# Patient Record
Sex: FEMALE | Race: WHITE | URBAN - METROPOLITAN AREA
[De-identification: names, ages, dates, MRNs, and addresses within clinical notes are randomized per-mention and may not be internally consistent; named-entity substitution may affect disease eponyms.]

---

## 2018-08-10 ENCOUNTER — OFFICE VISIT (OUTPATIENT)
Dept: URBAN - METROPOLITAN AREA CLINIC 18 | Facility: CLINIC | Age: 73
End: 2018-08-10
Payer: MEDICARE

## 2018-08-10 PROCEDURE — 99213 OFFICE O/P EST LOW 20 MIN: CPT | Performed by: OPTOMETRIST

## 2018-08-10 RX ORDER — TIMOLOL MALEATE 5 MG/ML
0.5 % SOLUTION OPHTHALMIC
Qty: 3 | Refills: 3 | Status: INACTIVE
Start: 2018-08-10 | End: 2019-04-16

## 2018-08-10 RX ORDER — BIMATOPROST 0.1 MG/ML
0.01 % SOLUTION/ DROPS OPHTHALMIC
Qty: 3 | Refills: 3 | Status: INACTIVE
Start: 2018-08-10 | End: 2018-08-10

## 2018-08-10 RX ORDER — BIMATOPROST 0.1 MG/ML
0.01 % SOLUTION/ DROPS OPHTHALMIC
Qty: 3 | Refills: 3 | Status: INACTIVE
Start: 2018-08-10 | End: 2019-09-12

## 2018-08-10 ASSESSMENT — INTRAOCULAR PRESSURE
OS: 14
OD: 13

## 2018-08-10 NOTE — IMPRESSION/PLAN
Impression: Diagnosis: Primary open-angle glaucoma, bilateral, moderate stage. Code: U16.8866. IOP stable OU
ON Drusen Plan: Continue Lumigan qhs OU, timolol . 5 q12h OU
orig IOP 19/19, OCT-44??/51(31?/43?0(38?/56)(48/53)(42/55)(45/57), VF-OU sup inf/glenroy unreliable BJW minimal changes OU BJW some mild changes

## 2018-08-10 NOTE — IMPRESSION/PLAN
Impression: Diagnosis: Type 2 diabetes mellitus without complications. Code: E11.9. Plan: Diabetes type II: no background retinopathy, no signs of neovascularization noted. Discussed ocular and systemic benefits of blood sugar control.

## 2018-12-07 ENCOUNTER — OFFICE VISIT (OUTPATIENT)
Dept: URBAN - METROPOLITAN AREA CLINIC 18 | Facility: CLINIC | Age: 73
End: 2018-12-07
Payer: MEDICARE

## 2018-12-07 DIAGNOSIS — Z96.1 PRESENCE OF INTRAOCULAR LENS: ICD-10-CM

## 2018-12-07 PROCEDURE — 99213 OFFICE O/P EST LOW 20 MIN: CPT | Performed by: OPTOMETRIST

## 2018-12-07 ASSESSMENT — INTRAOCULAR PRESSURE
OD: 16
OS: 16

## 2018-12-07 NOTE — IMPRESSION/PLAN
Impression: Diagnosis: Primary open-angle glaucoma, bilateral, moderate stage. Code: D40.0644. IOP borderline OU
ON Drusen Plan: Continue Lumigan qhs OU, timolol . 5 q12h OU
orig IOP 19/19, OCT-44??/51(31?/43?0(38?/56)(48/53)(42/55)(45/57), VF-OU sup inf/glenroy unreliable BJW minimal changes OU BJW some mild changes

## 2019-02-05 ENCOUNTER — OFFICE VISIT (OUTPATIENT)
Dept: URBAN - METROPOLITAN AREA CLINIC 17 | Facility: CLINIC | Age: 74
End: 2019-02-05
Payer: MEDICARE

## 2019-02-05 PROCEDURE — 99213 OFFICE O/P EST LOW 20 MIN: CPT | Performed by: OPTOMETRIST

## 2019-02-05 ASSESSMENT — INTRAOCULAR PRESSURE
OD: 17
OS: 17

## 2019-02-05 NOTE — IMPRESSION/PLAN
Impression: Diagnosis: Primary open-angle glaucoma, bilateral, moderate stage. Code: C43.3828. 
IOP stable OU- Patient is currently on Prednisone 20mg due to recent fall and will be off this week Plan: Continue Lumigan qhs OU, Timolol .5 Q12h OU
orig IOP 19/19, OCT-44??/51(31?/43?0(38?/56)(48/53)(42/55)(45/57), VF-OU sup inf/glenroy unreliable BJW minimal changes OU BJW some mild changes

## 2019-04-08 ENCOUNTER — OFFICE VISIT (OUTPATIENT)
Dept: URBAN - METROPOLITAN AREA CLINIC 18 | Facility: CLINIC | Age: 74
End: 2019-04-08
Payer: MEDICARE

## 2019-04-08 PROCEDURE — 99214 OFFICE O/P EST MOD 30 MIN: CPT | Performed by: OPTOMETRIST

## 2019-04-08 PROCEDURE — 92083 EXTENDED VISUAL FIELD XM: CPT | Performed by: OPTOMETRIST

## 2019-04-08 PROCEDURE — 92133 CPTRZD OPH DX IMG PST SGM ON: CPT | Performed by: OPTOMETRIST

## 2019-04-08 ASSESSMENT — INTRAOCULAR PRESSURE
OD: 14
OS: 11
OS: 14
OD: 10

## 2019-04-08 NOTE — IMPRESSION/PLAN
Impression: Diagnosis: Primary open-angle glaucoma, bilateral, moderate stage.  Code: Q75.4934.
stable IOP OU Plan: Continue Lumigan qhs OU, Timolol .5 Q12h OU
orig IOP 19/19, OCT-52?/46(44??/51)(31?/43?0(38?/56)(48/53)(42/55)(45/57), VF-OU sup inf glenroy BJW minimal changes OU

## 2019-04-08 NOTE — IMPRESSION/PLAN
Impression: Diagnosis: Type 2 diabetes mellitus without complications. Code: E11.9. Plan: Diabetes type II: No signs of neovascularization noted. No macular edema. No background retinopathy. Recommended careful diabetic blood sugar control and discussed benefits.

## 2019-07-23 ENCOUNTER — OFFICE VISIT (OUTPATIENT)
Dept: URBAN - METROPOLITAN AREA CLINIC 18 | Facility: CLINIC | Age: 74
End: 2019-07-23
Payer: MEDICARE

## 2019-07-23 DIAGNOSIS — E11.9 TYPE 2 DIABETES MELLITUS WITHOUT COMPLICATIONS: ICD-10-CM

## 2019-07-23 PROCEDURE — 92012 INTRM OPH EXAM EST PATIENT: CPT | Performed by: OPTOMETRIST

## 2019-07-23 ASSESSMENT — KERATOMETRY
OS: 46.38
OD: 46.13

## 2019-07-23 ASSESSMENT — INTRAOCULAR PRESSURE
OD: 14
OS: 15

## 2019-07-23 NOTE — IMPRESSION/PLAN
Impression: Diagnosis: Primary open-angle glaucoma, bilateral, moderate stage. Code: M96.0181. 
IOP stable OU Plan: Continue Lumigan qhs OU, Timolol .5 Q12h OU
orig IOP 19/19, OCT-52?/46(44??/51)(31?/43?0(38?/56)(48/53)(42/55)(45/57), VF-OU sup inf glenroy BJW minimal changes OU

## 2019-09-27 ENCOUNTER — OFFICE VISIT (OUTPATIENT)
Dept: URBAN - METROPOLITAN AREA CLINIC 18 | Facility: CLINIC | Age: 74
End: 2019-09-27
Payer: MEDICARE

## 2019-09-27 PROCEDURE — 92004 COMPRE OPH EXAM NEW PT 1/>: CPT | Performed by: OPHTHALMOLOGY

## 2019-09-27 ASSESSMENT — INTRAOCULAR PRESSURE
OS: 11
OD: 11

## 2019-09-27 ASSESSMENT — VISUAL ACUITY
OS: 20/80
OD: 20/70

## 2019-09-27 NOTE — IMPRESSION/PLAN
Impression: Presence of intraocular lens: Z96.1. OS. Plan: Discussed diagnosis in detail with patient. No treatment is required at this time. Will continue to observe condition and or symptoms.

## 2019-09-27 NOTE — IMPRESSION/PLAN
Impression: Age-related nuclear cataract, right eye: H25.11. Condition: established, worsening. Symptoms: could improve with surgery. Vision: vision affected. Plan: Cataract accounts for patient's complaints. Reviewed risks, benefits, and procedure. Patient desires surgery, schedule ce/iol OD, RL2, standard lens, distance refractive target, patient is clear for surgery in [place] [id number]. Pt aware she will need glasses after surgery for best vision.

## 2019-10-25 ENCOUNTER — PRE-OPERATIVE VISIT (OUTPATIENT)
Dept: URBAN - METROPOLITAN AREA CLINIC 17 | Facility: CLINIC | Age: 74
End: 2019-10-25
Payer: MEDICARE

## 2019-10-25 DIAGNOSIS — H25.11 AGE-RELATED NUCLEAR CATARACT, RIGHT EYE: Primary | ICD-10-CM

## 2019-10-25 ASSESSMENT — PACHYMETRY
OD: 22.58
OS: 22.44
OD: 2.92
OS: 4.65

## 2020-10-05 ENCOUNTER — OFFICE VISIT (OUTPATIENT)
Dept: URBAN - METROPOLITAN AREA CLINIC 18 | Facility: CLINIC | Age: 75
End: 2020-10-05
Payer: MEDICARE

## 2020-10-05 DIAGNOSIS — H40.1132 PRIMARY OPEN-ANGLE GLAUCOMA, BILATERAL, MODERATE STAGE: ICD-10-CM

## 2020-10-05 PROCEDURE — 92133 CPTRZD OPH DX IMG PST SGM ON: CPT | Performed by: OPTOMETRIST

## 2020-10-05 PROCEDURE — 92014 COMPRE OPH EXAM EST PT 1/>: CPT | Performed by: OPTOMETRIST

## 2020-10-05 PROCEDURE — 92083 EXTENDED VISUAL FIELD XM: CPT | Performed by: OPTOMETRIST

## 2020-10-05 ASSESSMENT — KERATOMETRY
OS: 46.63
OD: 46.13

## 2020-10-05 ASSESSMENT — INTRAOCULAR PRESSURE
OD: 14
OS: 11
OD: 10
OS: 16

## 2020-10-05 NOTE — IMPRESSION/PLAN
Impression: Diagnosis: Primary open-angle glaucoma, bilateral, moderate stage. Code: J50.8333. IOP still stable OU today Plan: Continue Lumigan qhs OU, Timolol .5 Q12h OU.  Will continue to monitor IOP's 
orig IOP 19/19, OCT-??/??(52?/46)(44??/51)(31?/43?0(38?/56)(48/53)(42/55)(45/57), VF- OD glenroy OS sup inf glenroy BJW

## 2020-10-05 NOTE — IMPRESSION/PLAN
Impression: Age-related nuclear cataract, right eye: H25.11. Condition: established, worsening. Symptoms: could improve with surgery. Vision: vision affected. Plan: Cataract accounts for patient's complaints. Reviewed risks, benefits, and procedure. Patient denies surgery at this time and states that the thought of it gives her a panic attack. Her health is in good condition, but refuses to have surgery at this time.